# Patient Record
Sex: FEMALE | Race: OTHER | NOT HISPANIC OR LATINO | Employment: UNEMPLOYED | URBAN - METROPOLITAN AREA
[De-identification: names, ages, dates, MRNs, and addresses within clinical notes are randomized per-mention and may not be internally consistent; named-entity substitution may affect disease eponyms.]

---
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
## 2021-08-19 ENCOUNTER — IMMUNIZATION (OUTPATIENT)
Dept: OBSTETRICS AND GYNECOLOGY | Facility: CLINIC | Age: 21
End: 2021-08-19
Payer: MEDICAID

## 2021-08-19 DIAGNOSIS — Z23 NEED FOR VACCINATION: Primary | ICD-10-CM

## 2021-08-19 PROCEDURE — 0001A COVID-19, MRNA, LNP-S, PF, 30 MCG/0.3 ML DOSE VACCINE: ICD-10-PCS | Mod: CV19,,, | Performed by: FAMILY MEDICINE

## 2021-08-19 PROCEDURE — 91300 COVID-19, MRNA, LNP-S, PF, 30 MCG/0.3 ML DOSE VACCINE: ICD-10-PCS | Mod: ,,, | Performed by: FAMILY MEDICINE

## 2021-08-19 PROCEDURE — 91300 COVID-19, MRNA, LNP-S, PF, 30 MCG/0.3 ML DOSE VACCINE: CPT | Mod: ,,, | Performed by: FAMILY MEDICINE

## 2021-08-19 PROCEDURE — 0001A COVID-19, MRNA, LNP-S, PF, 30 MCG/0.3 ML DOSE VACCINE: CPT | Mod: CV19,,, | Performed by: FAMILY MEDICINE

## 2021-09-08 ENCOUNTER — IMMUNIZATION (OUTPATIENT)
Dept: OBSTETRICS AND GYNECOLOGY | Facility: CLINIC | Age: 21
End: 2021-09-08
Payer: MEDICAID

## 2021-09-08 DIAGNOSIS — Z23 NEED FOR VACCINATION: Primary | ICD-10-CM

## 2021-09-08 PROCEDURE — 91300 COVID-19, MRNA, LNP-S, PF, 30 MCG/0.3 ML DOSE VACCINE: CPT | Mod: ,,, | Performed by: FAMILY MEDICINE

## 2021-09-08 PROCEDURE — 0002A COVID-19, MRNA, LNP-S, PF, 30 MCG/0.3 ML DOSE VACCINE: ICD-10-PCS | Mod: CV19,,, | Performed by: FAMILY MEDICINE

## 2021-09-08 PROCEDURE — 91300 COVID-19, MRNA, LNP-S, PF, 30 MCG/0.3 ML DOSE VACCINE: ICD-10-PCS | Mod: ,,, | Performed by: FAMILY MEDICINE

## 2021-09-08 PROCEDURE — 0002A COVID-19, MRNA, LNP-S, PF, 30 MCG/0.3 ML DOSE VACCINE: CPT | Mod: CV19,,, | Performed by: FAMILY MEDICINE

## 2022-02-11 ENCOUNTER — HOSPITAL ENCOUNTER (EMERGENCY)
Facility: HOSPITAL | Age: 22
Discharge: HOME | End: 2022-02-11
Attending: EMERGENCY MEDICINE
Payer: MEDICAID

## 2022-02-11 VITALS
HEIGHT: 62 IN | HEART RATE: 90 BPM | BODY MASS INDEX: 33.13 KG/M2 | WEIGHT: 180 LBS | SYSTOLIC BLOOD PRESSURE: 118 MMHG | RESPIRATION RATE: 18 BRPM | OXYGEN SATURATION: 97 % | TEMPERATURE: 98 F | DIASTOLIC BLOOD PRESSURE: 70 MMHG

## 2022-02-11 DIAGNOSIS — J45.901 MILD ASTHMA WITH EXACERBATION, UNSPECIFIED WHETHER PERSISTENT: Primary | ICD-10-CM

## 2022-02-11 LAB
B-HCG UR QL: NEGATIVE
CTP QC/QA: YES
CTP QC/QA: YES
SARS-COV-2 RDRP RESP QL NAA+PROBE: NEGATIVE

## 2022-02-11 PROCEDURE — 25000242 PHARM REV CODE 250 ALT 637 W/ HCPCS: Performed by: PHYSICIAN ASSISTANT

## 2022-02-11 PROCEDURE — 94760 N-INVAS EAR/PLS OXIMETRY 1: CPT

## 2022-02-11 PROCEDURE — 93010 EKG 12-LEAD: ICD-10-PCS | Mod: ,,, | Performed by: INTERNAL MEDICINE

## 2022-02-11 PROCEDURE — 99284 EMERGENCY DEPT VISIT MOD MDM: CPT | Mod: 25

## 2022-02-11 PROCEDURE — 81025 URINE PREGNANCY TEST: CPT | Performed by: PHYSICIAN ASSISTANT

## 2022-02-11 PROCEDURE — 93010 ELECTROCARDIOGRAM REPORT: CPT | Mod: ,,, | Performed by: INTERNAL MEDICINE

## 2022-02-11 PROCEDURE — U0002 COVID-19 LAB TEST NON-CDC: HCPCS | Performed by: PHYSICIAN ASSISTANT

## 2022-02-11 PROCEDURE — 93005 ELECTROCARDIOGRAM TRACING: CPT

## 2022-02-11 PROCEDURE — 63600175 PHARM REV CODE 636 W HCPCS: Performed by: PHYSICIAN ASSISTANT

## 2022-02-11 RX ORDER — IPRATROPIUM BROMIDE AND ALBUTEROL SULFATE 2.5; .5 MG/3ML; MG/3ML
3 SOLUTION RESPIRATORY (INHALATION)
Status: COMPLETED | OUTPATIENT
Start: 2022-02-11 | End: 2022-02-11

## 2022-02-11 RX ORDER — PREDNISONE 20 MG/1
60 TABLET ORAL
Status: COMPLETED | OUTPATIENT
Start: 2022-02-11 | End: 2022-02-11

## 2022-02-11 RX ADMIN — PREDNISONE 60 MG: 20 TABLET ORAL at 08:02

## 2022-02-11 RX ADMIN — IPRATROPIUM BROMIDE AND ALBUTEROL SULFATE 3 ML: 2.5; .5 SOLUTION RESPIRATORY (INHALATION) at 08:02

## 2022-02-11 NOTE — DISCHARGE INSTRUCTIONS
Please return to the Emergency Department for any new or worsening symptoms including: fever, chest pain, shortness of breath, loss of consciousness, dizziness, weakness, or any other concerns.     Please schedule an appointment for follow up with your Primary Care Doctor as soon as possible for a recheck of your symptoms. If you do not have one, you may contact the one listed on your discharge paperwork or you may also call the [facility] Appointment Desk at [phone] to schedule an appointment with one.     Please take all medication as prescribed.

## 2022-02-11 NOTE — ED PROVIDER NOTES
"Encounter Date: 2/11/2022    SCRIBE #1 NOTE: I, Maritza Carrion, am scribing for, and in the presence of,  Mery De Leon PA-C. I have scribed the following portions of the note - Other sections scribed: HPI, ROS, PE.       History     Chief Complaint   Patient presents with    Shortness of Breath    Chest Pain     Patient  reports SOB and chest pain with deep breath, states H/O Asthma and hurts to take a deep breath, did inhaler no help, tachypnea in triage.      Tia Hunter is a 22 y.o. female, with a past medical history of Asthma, who presents to the ED today with a complaint of chest pain since this morning. The patient states that she woke up this morning with chest pain and used her Albuterol inhaler with no relief ("chest still feels tight"). She reports associated chest tightness, cough, and shortness of breath. She notes that her chest tightness is worsened when taking deep breaths. She states that she does not have a nebulizer machine at home. No known triggers. She denies fever, congestion, sore throat, abdominal pain, nausea, vomiting, or other associated symptoms. No other complaints at this time.    The history is provided by the patient.     Review of patient's allergies indicates:   Allergen Reactions    Citrus and derivatives     Shellfish containing products      No seafood.      Past Medical History:   Diagnosis Date    Asthma      No past surgical history on file.  No family history on file.  Social History     Tobacco Use    Smoking status: Never Smoker     Review of Systems   Constitutional: Negative for chills and fever.   HENT: Negative for congestion and sore throat.    Eyes: Negative for visual disturbance.   Respiratory: Positive for cough, chest tightness and shortness of breath.    Cardiovascular: Positive for chest pain.   Gastrointestinal: Negative for abdominal pain, diarrhea, nausea and vomiting.   Genitourinary: Negative for dysuria.   Musculoskeletal: Negative for neck " pain.   Skin: Negative for rash.   Allergic/Immunologic: Negative for immunocompromised state.   Neurological: Negative for headaches.   Psychiatric/Behavioral: Negative for dysphoric mood.       Physical Exam     Initial Vitals [02/11/22 0738]   BP Pulse Resp Temp SpO2   129/76 99 (!) 24 98.2 °F (36.8 °C) 100 %      MAP       --         Physical Exam    Nursing note and vitals reviewed.  Constitutional: She appears well-developed and well-nourished. She is not diaphoretic. No distress.   HENT:   Head: Normocephalic and atraumatic.   Mouth/Throat: Oropharynx is clear and moist. No oropharyngeal exudate.   Eyes: Conjunctivae and EOM are normal. Pupils are equal, round, and reactive to light.   Neck: Neck supple.   Normal range of motion.  Cardiovascular: Normal rate, regular rhythm, normal heart sounds and intact distal pulses.   Pulmonary/Chest: No respiratory distress. She has no wheezes. She has no rhonchi. She has no rales.   Slightly decreased breath sounds over the right lower lung zone. No wheezing heard. The patient is speaking clear and full sentences with no evidence of respiratory distress.   Musculoskeletal:         General: Normal range of motion.      Cervical back: Normal range of motion and neck supple.     Neurological: She is alert and oriented to person, place, and time. GCS score is 15. GCS eye subscore is 4. GCS verbal subscore is 5. GCS motor subscore is 6.   Skin: Skin is warm and dry. Capillary refill takes less than 2 seconds.   Psychiatric: She has a normal mood and affect. Thought content normal.         ED Course   Procedures  Labs Reviewed   POCT URINE PREGNANCY - Normal   SARS-COV-2 RDRP GENE - Normal          Imaging Results          X-Ray Chest PA And Lateral (Final result)  Result time 02/11/22 08:11:24    Final result by Tu Barragan DO (02/11/22 08:11:24)                 Impression:      No acute cardiopulmonary abnormality.      Electronically signed by: Tu  Laurel  Date:    02/11/2022  Time:    08:11             Narrative:    EXAMINATION:  XR CHEST PA AND LATERAL    CLINICAL HISTORY:  Asthma;    TECHNIQUE:  PA and lateral views of the chest were performed.    COMPARISON:  None    FINDINGS:  The lungs are well expanded and clear. No focal opacities are seen. The pleural spaces are clear.    The cardiac silhouette is unremarkable.    The visualized osseous structures are unremarkable.                                 Medications   albuterol-ipratropium 2.5 mg-0.5 mg/3 mL nebulizer solution 3 mL (3 mLs Nebulization Given 2/11/22 0859)   predniSONE tablet 60 mg (60 mg Oral Given 2/11/22 0826)           APC / Resident Notes:   22 year old patient presenting with sob consistent with an asthma exacerbation. Patient has no altered mental status, improved respiratory status, and no signs of impending ventilator failure. Patient was given 2.5 mg of albuterol and 0.5 mg of ipratropium. Patient has home regimen to control further exacerbations and primary care follow up.     Also considered but less likely:   Pneumonia: no fever, unilateral ronchi, or signs concerning of pneumonia  COPD: no history of COPD  ACS: presentation atypical and more consistent with asthma  Pneumothorax: bilateral breath sounds and wheezing bilaterally  CHF: no signs of fluid overload and no history    Return precautions given, patient understands and agrees with plan. All questions answered.  Instructed to follow up with PCP.        Scribe Attestation:   Scribe #1: I performed the above scribed service and the documentation accurately describes the services I performed. I attest to the accuracy of the note.                 Clinical Impression:   Final diagnoses:  [J45.901] Mild asthma with exacerbation, unspecified whether persistent (Primary)          ED Disposition Condition    Discharge Stable        ED Prescriptions     None        Follow-up Information     Follow up With Specialties Details Why  Contact Info    Ethan Alvarenga MD Pediatrics Schedule an appointment as soon as possible for a visit   [address] [place] [address]  [phone]      [facility] Emergency Medicine  If symptoms worsen [address] [place] [address] [phone]         I, Mery De Leon PA-C, personally performed the services described in this documentation. All medical record entries made by the scribe were at my direction and in my presence. I have reviewed the chart and agree that the record reflects my personal performance and is accurate and complete.     Mery De Leon PA-C  02/11/22 1212

## 2022-02-11 NOTE — ED TRIAGE NOTES
Patient presents to ED c/o chest pain and SOB that started this morning. Pain is located at center of chest and denies pain radiating.Pain 8/10. Reports taking two pumps of inhaler with no relief. Hx of asthma.       Denies N/V/D, fever, chills.     AAOx4

## 2023-03-20 ENCOUNTER — HOSPITAL ENCOUNTER (EMERGENCY)
Facility: HOSPITAL | Age: 23
Discharge: HOME | End: 2023-03-20
Attending: EMERGENCY MEDICINE
Payer: MEDICAID

## 2023-03-20 VITALS
RESPIRATION RATE: 18 BRPM | OXYGEN SATURATION: 100 % | WEIGHT: 209 LBS | HEIGHT: 62 IN | TEMPERATURE: 99 F | SYSTOLIC BLOOD PRESSURE: 141 MMHG | DIASTOLIC BLOOD PRESSURE: 94 MMHG | BODY MASS INDEX: 38.46 KG/M2 | HEART RATE: 79 BPM

## 2023-03-20 DIAGNOSIS — R10.9 ABDOMINAL CRAMPING: ICD-10-CM

## 2023-03-20 DIAGNOSIS — N93.9 VAGINAL BLEEDING: Primary | ICD-10-CM

## 2023-03-20 LAB
ABO + RH BLD: NORMAL
ALBUMIN SERPL BCP-MCNC: 3.9 G/DL (ref 3.5–5.2)
ALP SERPL-CCNC: 69 U/L (ref 55–135)
ALT SERPL W/O P-5'-P-CCNC: 12 U/L (ref 10–44)
ANION GAP SERPL CALC-SCNC: 11 MMOL/L (ref 8–16)
APTT BLDCRRT: 25.3 SEC (ref 21–32)
AST SERPL-CCNC: 19 U/L (ref 10–40)
B-HCG UR QL: NEGATIVE
BACTERIA GENITAL QL WET PREP: ABNORMAL
BASOPHILS # BLD AUTO: 0.07 K/UL (ref 0–0.2)
BASOPHILS NFR BLD: 1 % (ref 0–1.9)
BILIRUB SERPL-MCNC: 0.2 MG/DL (ref 0.1–1)
BLD GP AB SCN CELLS X3 SERPL QL: NORMAL
BUN SERPL-MCNC: 9 MG/DL (ref 6–20)
CALCIUM SERPL-MCNC: 9.3 MG/DL (ref 8.7–10.5)
CHLORIDE SERPL-SCNC: 108 MMOL/L (ref 95–110)
CLUE CELLS VAG QL WET PREP: ABNORMAL
CO2 SERPL-SCNC: 23 MMOL/L (ref 23–29)
CREAT SERPL-MCNC: 0.8 MG/DL (ref 0.5–1.4)
CTP QC/QA: YES
DIFFERENTIAL METHOD: ABNORMAL
EOSINOPHIL # BLD AUTO: 0.5 K/UL (ref 0–0.5)
EOSINOPHIL NFR BLD: 7.7 % (ref 0–8)
ERYTHROCYTE [DISTWIDTH] IN BLOOD BY AUTOMATED COUNT: 12.5 % (ref 11.5–14.5)
EST. GFR  (NO RACE VARIABLE): >60 ML/MIN/1.73 M^2
FILAMENT FUNGI VAG WET PREP-#/AREA: ABNORMAL
GLUCOSE SERPL-MCNC: 79 MG/DL (ref 70–110)
HCT VFR BLD AUTO: 42.1 % (ref 37–48.5)
HGB BLD-MCNC: 13.8 G/DL (ref 12–16)
IMM GRANULOCYTES # BLD AUTO: 0.02 K/UL (ref 0–0.04)
IMM GRANULOCYTES NFR BLD AUTO: 0.3 % (ref 0–0.5)
INR PPP: 0.9 (ref 0.8–1.2)
LYMPHOCYTES # BLD AUTO: 1.8 K/UL (ref 1–4.8)
LYMPHOCYTES NFR BLD: 26.2 % (ref 18–48)
MCH RBC QN AUTO: 29 PG (ref 27–31)
MCHC RBC AUTO-ENTMCNC: 32.8 G/DL (ref 32–36)
MCV RBC AUTO: 88 FL (ref 82–98)
MONOCYTES # BLD AUTO: 0.4 K/UL (ref 0.3–1)
MONOCYTES NFR BLD: 6.1 % (ref 4–15)
NEUTROPHILS # BLD AUTO: 4 K/UL (ref 1.8–7.7)
NEUTROPHILS NFR BLD: 58.7 % (ref 38–73)
NRBC BLD-RTO: 0 /100 WBC
PLATELET # BLD AUTO: 483 K/UL (ref 150–450)
PMV BLD AUTO: 8.3 FL (ref 9.2–12.9)
POTASSIUM SERPL-SCNC: 4.5 MMOL/L (ref 3.5–5.1)
PROT SERPL-MCNC: 7.9 G/DL (ref 6–8.4)
PROTHROMBIN TIME: 10 SEC (ref 9–12.5)
RBC # BLD AUTO: 4.76 M/UL (ref 4–5.4)
SODIUM SERPL-SCNC: 142 MMOL/L (ref 136–145)
SPECIMEN SOURCE: ABNORMAL
T VAGINALIS GENITAL QL WET PREP: ABNORMAL
WBC # BLD AUTO: 6.73 K/UL (ref 3.9–12.7)
WBC #/AREA VAG WET PREP: ABNORMAL
YEAST GENITAL QL WET PREP: ABNORMAL

## 2023-03-20 PROCEDURE — 87591 N.GONORRHOEAE DNA AMP PROB: CPT

## 2023-03-20 PROCEDURE — 25000003 PHARM REV CODE 250

## 2023-03-20 PROCEDURE — 99284 EMERGENCY DEPT VISIT MOD MDM: CPT | Mod: 25

## 2023-03-20 PROCEDURE — 96361 HYDRATE IV INFUSION ADD-ON: CPT

## 2023-03-20 PROCEDURE — 85730 THROMBOPLASTIN TIME PARTIAL: CPT

## 2023-03-20 PROCEDURE — 96360 HYDRATION IV INFUSION INIT: CPT

## 2023-03-20 PROCEDURE — 85025 COMPLETE CBC W/AUTO DIFF WBC: CPT

## 2023-03-20 PROCEDURE — 85610 PROTHROMBIN TIME: CPT

## 2023-03-20 PROCEDURE — 81025 URINE PREGNANCY TEST: CPT | Performed by: PHYSICIAN ASSISTANT

## 2023-03-20 PROCEDURE — 87210 SMEAR WET MOUNT SALINE/INK: CPT

## 2023-03-20 PROCEDURE — 86900 BLOOD TYPING SEROLOGIC ABO: CPT

## 2023-03-20 PROCEDURE — 80053 COMPREHEN METABOLIC PANEL: CPT

## 2023-03-20 RX ORDER — IBUPROFEN 400 MG/1
400 TABLET ORAL EVERY 6 HOURS PRN
Qty: 30 TABLET | Refills: 0 | Status: SHIPPED | OUTPATIENT
Start: 2023-03-20

## 2023-03-20 RX ADMIN — SODIUM CHLORIDE 1000 ML: 9 INJECTION, SOLUTION INTRAVENOUS at 03:03

## 2023-03-20 NOTE — ED PROVIDER NOTES
Encounter Date: 3/20/2023       History     Chief Complaint   Patient presents with    Vaginal Bleeding     Patient is having some vaginal bleeding since January. Bleeding is bright red and is intermittent between menstrual cycles. Reported occasional abdominal cramping. LMP-3/3/23     23-year-old female with no past medical history presents to ED complaining of acute onset intermittent vaginal bleeding with associated lower abdominal cramping since January 2023.  She reports noticing bright red vaginal bleeding post wiping intermittently after urination.  She reports history of irregular menses.  Denies any abdominal cramping at this time.  She is attempted 800 mg ibuprofen for her cramping in the past with relief to her pain.  Her last menstrual period was 03/01/2023.  She is also complaining of intermittent white vaginal discharge.  She denies any concern for STDs.  She denies any fever, chills, chest pain, shortness of breath, nausea, vomiting, diarrhea, dysuria, hematuria.  No other symptoms reported.    The history is provided by the patient. No  was used.   Review of patient's allergies indicates:   Allergen Reactions    Citrus and derivatives     Shellfish containing products      No seafood.      Past Medical History:   Diagnosis Date    Asthma      No past surgical history on file.  No family history on file.  Social History     Tobacco Use    Smoking status: Never     Review of Systems   Constitutional:  Negative for chills and fever.   HENT:  Negative for congestion, ear pain, rhinorrhea and sore throat.    Eyes:  Negative for redness.   Respiratory:  Negative for cough and shortness of breath.    Cardiovascular:  Negative for chest pain.   Gastrointestinal:  Positive for abdominal pain. Negative for diarrhea, nausea and vomiting.   Genitourinary:  Positive for vaginal bleeding and vaginal discharge. Negative for decreased urine volume, difficulty urinating, dysuria, frequency,  hematuria and urgency.   Musculoskeletal:  Negative for back pain and neck pain.   Skin:  Negative for rash.   Neurological:  Negative for headaches.   Psychiatric/Behavioral:  Negative for confusion.      Physical Exam     Initial Vitals [03/20/23 1253]   BP Pulse Resp Temp SpO2   134/82 103 18 98.9 °F (37.2 °C) 100 %      MAP       --         Physical Exam    Nursing note and vitals reviewed.  Constitutional: She appears well-developed and well-nourished.  Non-toxic appearance. She does not appear ill.   HENT:   Head: Normocephalic and atraumatic.   Right Ear: Hearing, tympanic membrane, external ear and ear canal normal. Tympanic membrane is not perforated, not erythematous and not bulging.   Left Ear: Hearing, tympanic membrane, external ear and ear canal normal. Tympanic membrane is not perforated, not erythematous and not bulging.   Nose: Nose normal.   Mouth/Throat: Uvula is midline, oropharynx is clear and moist and mucous membranes are normal.   Eyes: Conjunctivae and EOM are normal.   Neck: Neck supple.   Normal range of motion.   Full passive range of motion without pain.     Cardiovascular:  Normal rate and regular rhythm.           Pulses:       Radial pulses are 2+ on the right side and 2+ on the left side.   Pulmonary/Chest: Effort normal and breath sounds normal. No accessory muscle usage. No respiratory distress. She has no decreased breath sounds.   Abdominal: Abdomen is soft. Bowel sounds are normal. She exhibits no distension. There is no abdominal tenderness.   No right CVA tenderness.  No left CVA tenderness. There is no rebound and no guarding.   Genitourinary:    Genitourinary Comments:  exam chaperoned by nursePatricia. Scant vaginal bleeding. No hemorrhage. No adnexal masses or tenderness. Cervix is not friable. No CMT. No vaginal erythema, foreign body, or tenderness.      Musculoskeletal:         General: Normal range of motion.      Cervical back: Full passive range of motion without  pain, normal range of motion and neck supple. No rigidity.     Neurological: She is alert. No cranial nerve deficit.   Neuro intact.  Strength and sensation intact bilateral upper and lower extremities.   Skin: Skin is warm and dry.   Psychiatric: She has a normal mood and affect.       ED Course   Procedures  Labs Reviewed   VAGINAL SCREEN - Abnormal; Notable for the following components:       Result Value    WBC - Vaginal Screen Occasional (*)     Bacteria - Vaginal Screen Few (*)     All other components within normal limits    Narrative:     Release to patient->Immediate   CBC W/ AUTO DIFFERENTIAL - Abnormal; Notable for the following components:    Platelets 483 (*)     MPV 8.3 (*)     All other components within normal limits   C. TRACHOMATIS/N. GONORRHOEAE BY AMP DNA   COMPREHENSIVE METABOLIC PANEL   APTT   PROTIME-INR   POCT URINE PREGNANCY   TYPE & SCREEN          Imaging Results              US Pelvis Comp with Transvag NON-OB (xpd (Final result)  Result time 03/20/23 16:55:25      Final result by Hal Coughlin MD (03/20/23 16:55:25)                   Impression:      No acute sonographic abnormalities of the uterus or ovaries.      Electronically signed by: Hal Coughlin MD  Date:    03/20/2023  Time:    16:55               Narrative:    EXAMINATION:  US PELVIS COMP WITH TRANSVAG NON-OB (XPD)    CLINICAL HISTORY:  vaginal bleeding;    TECHNIQUE:  Transabdominal sonography of the pelvis was performed, followed by transvaginal sonography to better evaluate the uterus and ovaries.    COMPARISON:  None.    FINDINGS:  The uterus measures approximately 6.0 x 2.5 x 3.5 cm.  The cervix is unremarkable.  The endometrial stripe is not thickened measuring 0.4 cm.  No abnormal endometrial hyperemia.    The right ovary measures approximately 3.2 x 1.7 x 2.3 cm.  The left ovary measures approximately 2.8 x 1.7 x 2.1 cm.  Arterial and venous flow is documented to the ovaries bilaterally.  No significant free  fluid in the pelvis.                                       Medications   sodium chloride 0.9% bolus 1,000 mL 1,000 mL (0 mLs Intravenous Stopped 3/20/23 1733)     Medical Decision Making:   Clinical Tests:   Lab Tests: Ordered and Reviewed  Radiological Study: Ordered and Reviewed  ED Management:  This is a 23-year-old female with no past medical history presents to ED complaining of acute onset intermittent vaginal bleeding with associated lower abdominal cramping since January 2023.  On physical exam, patient is well-appearing and in no acute distress.  Nontoxic appearing.  Lungs are clear to auscultation bilaterally.  Abdomen is soft and nontender.  No guarding, rigidity, rebound.  2+ radial pulses bilaterally.  Posterior oropharynx is not erythematous.  No edema or exudate.  exam chaperoned by nursePatricia. Scant vaginal bleeding. No hemorrhage. No adnexal masses or tenderness. Cervix is not friable. No CMT. No vaginal erythema, foreign body, or tenderness.  Uvula midline.  Bilateral tympanic membrane is normal.  No erythema, bulging, or perforations.  Neuro intact.  Strength and sensation intact bilateral upper and lower extremities.  No CVA tenderness bilaterally.  CBC unremarkable.  Hemoglobin 13.8.  Hematocrit 42.1.  Doubt anemia.  Fluids ordered.  Will reassess. Vaginal screen revealed occasional WBC and few bacteria. GC pending. CBC revealed platelet 483. No leukocytosis. Hb 13.8 and Hct 42.1. no evidence of anemia. PTT and PTinr unremarkable. Patient is AB+. uPT is negative.  US revealed No acute sonographic abnormalities of the uterus or ovaries. Unsure etiology of patient's symptoms. Will discharge patient on ibuprofen as needed for pain. Urged prompt f/u with OBGYN and PCP for further evaluation.    Strict return precautions given. I discussed with the patient/family the diagnosis, treatment plan, indications for return to the emergency department, and for expected follow-up. The patient/family  verbalized an understanding. The patient/family is asked if there are any questions or concerns. We discuss the case, until all issues are addressed to the patient/family's satisfaction. Patient/family understands and is agreeable to the plan. Patient is stable and ready for discharge.                          Clinical Impression:   Final diagnoses:  [N93.9] Vaginal bleeding (Primary)  [R10.9] Abdominal cramping        ED Disposition Condition    Discharge Stable          ED Prescriptions       Medication Sig Dispense Start Date End Date Auth. Provider    ibuprofen (ADVIL,MOTRIN) 400 MG tablet Take 1 tablet (400 mg total) by mouth every 6 (six) hours as needed for Other (pain). 30 tablet 3/20/2023 -- Cayetano Becker PA-C          Follow-up Information       Follow up With Specialties Details Why Contact Info    Ethan Alvarenga MD Pediatrics In 2 days for further evaluation [address] [facility] [address]  [phone]      [facility] - Emergency Dept Emergency Medicine In 2 days  [address] [facility] [address] [phone]             Cayetano Becker PA-C  03/20/23 6693

## 2023-03-20 NOTE — DISCHARGE INSTRUCTIONS

## 2023-03-20 NOTE — FIRST PROVIDER EVALUATION
Emergency Department TeleTriage Encounter Note      CHIEF COMPLAINT    Chief Complaint   Patient presents with    Vaginal Bleeding     Patient is having some vaginal bleeding since January. Bleeding is bright red and is intermittent between menstrual cycles. Reported occasional abdominal cramping. LMP-3/3/23       VITAL SIGNS   Initial Vitals [03/20/23 1253]   BP Pulse Resp Temp SpO2   134/82 103 18 98.9 °F (37.2 °C) 100 %      MAP       --            ALLERGIES    Review of patient's allergies indicates:   Allergen Reactions    Citrus and derivatives     Shellfish containing products      No seafood.        PROVIDER TRIAGE NOTE  Patient presents with complaint of vaginal bleeding yesterday. No bleeding today. She reports irregular bleeding for two months. She reports associated vaginal cramping. She reports no vaginal discharge. No urinary symptoms.      Phy:   Constitutional: well nourished, well developed, appearing stated age, NAD   HEENT: NCAT, symmetrical lids, No obvious facial deformity.  Normal phonation. Normal Conjunctiva   Neck: NAROM   Respiratory: Normal effort.  No obvious use of accessory muscles   Musculoskeletal: Moved upper extremities well   Neuro: Alert, answers questions appropriately    Psych: appropriate mood and affect      Initial orders will be placed and care will be transferred to an alternate provider when patient is roomed for a full evaluation. Any additional orders and the final disposition will be determined by that provider.        ORDERS  Labs Reviewed - No data to display    ED Orders (720h ago, onward)      None              Virtual Visit Note: The provider triage portion of this emergency department evaluation and documentation was performed via [employer], a HIPAA-compliant telemedicine application, in concert with a tele-presenter in the room. A face to face patient evaluation with one of my colleagues will occur once the patient is placed in an emergency department  room.      DISCLAIMER: This note was prepared with [employer] voice recognition transcription software. Garbled syntax, mangled pronouns, and other bizarre constructions may be attributed to that software system.

## 2023-03-20 NOTE — ED TRIAGE NOTES
Vaginal Bleeding (Patient is having some vaginal bleeding since January. Bleeding is bright red and is intermittent between menstrual cycles. Reported occasional abdominal cramping. LMP-3/3/23)

## 2023-03-21 LAB
C TRACH DNA SPEC QL NAA+PROBE: NOT DETECTED
N GONORRHOEA DNA SPEC QL NAA+PROBE: NOT DETECTED

## 2023-09-04 ENCOUNTER — HOSPITAL ENCOUNTER (EMERGENCY)
Facility: HOSPITAL | Age: 23
Discharge: HOME | End: 2023-09-04
Attending: EMERGENCY MEDICINE
Payer: MEDICAID

## 2023-09-04 VITALS
WEIGHT: 196 LBS | HEART RATE: 103 BPM | TEMPERATURE: 98 F | HEIGHT: 62 IN | OXYGEN SATURATION: 100 % | SYSTOLIC BLOOD PRESSURE: 126 MMHG | BODY MASS INDEX: 36.07 KG/M2 | DIASTOLIC BLOOD PRESSURE: 72 MMHG | RESPIRATION RATE: 18 BRPM

## 2023-09-04 DIAGNOSIS — S99.912A ANKLE INJURY, LEFT, INITIAL ENCOUNTER: ICD-10-CM

## 2023-09-04 DIAGNOSIS — S93.402A SPRAIN OF LEFT ANKLE, UNSPECIFIED LIGAMENT, INITIAL ENCOUNTER: Primary | ICD-10-CM

## 2023-09-04 LAB
B-HCG UR QL: NEGATIVE
CTP QC/QA: YES

## 2023-09-04 PROCEDURE — 81025 URINE PREGNANCY TEST: CPT

## 2023-09-04 PROCEDURE — 96372 THER/PROPH/DIAG INJ SC/IM: CPT

## 2023-09-04 PROCEDURE — 99284 EMERGENCY DEPT VISIT MOD MDM: CPT

## 2023-09-04 PROCEDURE — 63600175 PHARM REV CODE 636 W HCPCS

## 2023-09-04 RX ORDER — ACETAMINOPHEN 500 MG
500 TABLET ORAL EVERY 6 HOURS PRN
Qty: 28 TABLET | Refills: 0 | Status: SHIPPED | OUTPATIENT
Start: 2023-09-04 | End: 2023-09-11

## 2023-09-04 RX ORDER — NAPROXEN 500 MG/1
500 TABLET ORAL 2 TIMES DAILY WITH MEALS
Qty: 10 TABLET | Refills: 0 | Status: SHIPPED | OUTPATIENT
Start: 2023-09-04 | End: 2023-09-09

## 2023-09-04 RX ORDER — KETOROLAC TROMETHAMINE 30 MG/ML
30 INJECTION, SOLUTION INTRAMUSCULAR; INTRAVENOUS
Status: COMPLETED | OUTPATIENT
Start: 2023-09-04 | End: 2023-09-04

## 2023-09-04 RX ADMIN — KETOROLAC TROMETHAMINE 30 MG: 30 INJECTION, SOLUTION INTRAMUSCULAR; INTRAVENOUS at 06:09

## 2023-09-04 NOTE — ED PROVIDER NOTES
"Encounter Date: 9/4/2023    SCRIBE #1 NOTE: I, Kenisha Willoughby, am scribing for, and in the presence of,  Trevor Islas PA-C. I have scribed the following portions of the note - Other sections scribed: HPI/ROS.       History     Chief Complaint   Patient presents with    Ankle Pain     Pt to ED reporting left ankle swelling and pain x yesterday. Pt reporting she came down on ankle wrong at [employer].      Tia Hunter is a 23 y.o. female, with a PMHx of Asthma, who presents to the ED with left ankle pain s/p fall on yesterday. Pt notes jumping at [employer] when she "came down wrong". Pt heard a pop and then noticed left ankle swelling. Pt notes pain is 9/10. Pt has increased pain to medial and lateral ankle. Pt attempted treatment with Tylenol. Pt wearing a boot she had at home from previous injury to left ankle in March 2023. Pt notes she can't take any steps on her own. This is the extent of the patient's complaints today in the Emergency Department.            The history is provided by the patient.     Review of patient's allergies indicates:   Allergen Reactions    Citrus and derivatives     Shellfish containing products      No seafood.      Past Medical History:   Diagnosis Date    Asthma      No past surgical history on file.  No family history on file.  Social History     Tobacco Use    Smoking status: Never     Review of Systems   Constitutional:  Negative for chills and fever.   HENT:  Negative for facial swelling and sore throat.    Eyes:  Negative for visual disturbance.   Respiratory:  Negative for cough and shortness of breath.    Cardiovascular:  Negative for chest pain and palpitations.   Gastrointestinal:  Negative for abdominal pain, nausea and vomiting.   Genitourinary:  Negative for dysuria and hematuria.   Musculoskeletal:  Negative for back pain.        Positive for left ankle pain and swelling.   Skin:  Negative for rash.   Neurological:  Negative for weakness and headaches. "   Hematological:  Does not bruise/bleed easily.   Psychiatric/Behavioral: Negative.         Physical Exam     Initial Vitals [09/04/23 1722]   BP Pulse Resp Temp SpO2   126/72 103 18 98.2 °F (36.8 °C) 100 %      MAP       --         Physical Exam    Nursing note and vitals reviewed.  Constitutional: Vital signs are normal. She appears well-developed and well-nourished. She is cooperative. She does not appear ill. No distress.   HENT:   Head: Normocephalic and atraumatic.   Right Ear: Hearing and external ear normal.   Left Ear: Hearing and external ear normal.   Nose: Nose normal.   Eyes: Conjunctivae and EOM are normal.   Neck: Phonation normal.   Normal range of motion.  Cardiovascular:  Normal rate and regular rhythm.           No murmur heard.  Pulmonary/Chest: Effort normal. No respiratory distress.   Abdominal: Abdomen is soft. She exhibits no distension. There is no abdominal tenderness.   Musculoskeletal:      Cervical back: Normal range of motion.      Left ankle: No swelling, deformity or ecchymosis. Tenderness present over the lateral malleolus and medial malleolus. Decreased range of motion.      Left Achilles Tendon: Normal. Faria's test negative.      Comments: Left ankle with no significant swelling or deformity.  Decreased active range of motion on flexion and extension.  Tenderness over the lateral and medial malleoli.  Patient is ambulatory.     Neurological: She is alert and oriented to person, place, and time. GCS eye subscore is 4. GCS verbal subscore is 5. GCS motor subscore is 6.   Skin: Skin is warm. Capillary refill takes less than 2 seconds.         ED Course   Procedures  Labs Reviewed   POCT URINE PREGNANCY          Imaging Results              X-Ray Ankle Complete Left (Final result)  Result time 09/04/23 18:57:12      Final result by Hal Coughlin MD (09/04/23 18:57:12)                   Impression:      1. No acute displaced fracture or dislocation of the  ankle.      Electronically signed by: Hal Coughlin MD  Date:    09/04/2023  Time:    18:57               Narrative:    EXAMINATION:  XR ANKLE COMPLETE 3 VIEW LEFT    CLINICAL HISTORY:  Unspecified injury of left ankle, initial encounter    TECHNIQUE:  AP, lateral and oblique views of the left ankle were performed.    COMPARISON:  None    FINDINGS:  Three views left ankle.    There is edema overlying the lateral malleolus.  The ankle mortise is intact.  No acute displaced fracture or dislocation of the ankle.  No radiopaque foreign body.                                    X-Rays:   Independently Interpreted Readings:   Other Readings:  X-ray ankle three-view left:   Bones are well mineralized.  No acute fractures or dislocations.  Ankle mortise is intact.  No significant soft tissue swelling.  No radiopaque foreign body.    Medications   ketorolac injection 30 mg (30 mg Intramuscular Given 9/4/23 1816)     Medical Decision Making  23-year-old female presenting to the emergency department with a chief complaint of ankle pain.  She does not recall the exact mechanism of injury but she was jumping on a trampoline.  She has been having achy pain since the time of the injury yesterday.  She was ambulatory.  On physical exam, she was clinically well-appearing and in no acute distress.  Tenderness over the medial and lateral malleoli of the left ankle.    Differential diagnosis includes but is not limited to contusion, strain, sprain, fracture, dislocation, or ligamentous injury of the affected ankle.     X-rays negative for any acute fractures or dislocations of the left ankle.  Presentation is consistent with an ankle sprain.  Patient arrived wearing a walking boot, advised her that she could continue doing this if it would help her feel more stable walking.  Acute pain management in the emergency department with 1 dose of Toradol.  Naproxen and Tylenol electronically prescribed and sent to the patient's preferred  pharmacy.  Patient is stable for discharge home.    Return precautions were discussed, all patient questions were answered, and the patient was agreeable to the plan of care.  She was discharged home in stable condition and will follow up with her primary care provider or return to the emergency department if her symptoms worsen or do not improve.     Amount and/or Complexity of Data Reviewed  Radiology: ordered.    Risk  OTC drugs.  Prescription drug management.            Scribe Attestation:   Scribe #1: I performed the above scribed service and the documentation accurately describes the services I performed. I attest to the accuracy of the note.                        Clinical Impression:   Final diagnoses:  [S99.912A] Ankle injury, left, initial encounter  [S93.402A] Sprain of left ankle, unspecified ligament, initial encounter (Primary)        ED Disposition Condition    Discharge Stable          ED Prescriptions       Medication Sig Dispense Start Date End Date Auth. Provider    acetaminophen (TYLENOL) 500 MG tablet Take 1 tablet (500 mg total) by mouth every 6 (six) hours as needed. 28 tablet 9/4/2023 9/11/2023 Trevor Islas PA-C    naproxen (NAPROSYN) 500 MG tablet Take 1 tablet (500 mg total) by mouth 2 (two) times daily with meals. for 5 days 10 tablet 9/4/2023 9/9/2023 Trevor Islas PA-C          Follow-up Information       Follow up With Specialties Details Why Contact Info    Ethan Alvarenga MD Pediatrics Schedule an appointment as soon as possible for a visit  As needed, If symptoms worsen [address] [facility] [address]  [phone]      [facility] Emergency Dept Emergency Medicine Go to  If symptoms worsen [address]  [facility] [address] [phone]        Trevor PURCELL PA-C, personally performed the services described in this documentation. All medical record entries made by the scribe were at my direction and in my presence. I have reviewed the chart and  agree that the record reflects my personal performance and is accurate and complete.      Trevor Islas, PA-C  09/04/23 1457

## 2023-09-04 NOTE — Clinical Note
"Tia"Lois Hunter was seen and treated in our emergency department on 9/4/2023.  She may return to work on 09/05/2023.       If you have any questions or concerns, please don't hesitate to call.      Trevor Islas, CHRISTY"

## 2023-09-05 NOTE — DISCHARGE INSTRUCTIONS

## 2025-07-24 ENCOUNTER — HOSPITAL ENCOUNTER (EMERGENCY)
Facility: HOSPITAL | Age: 25
Discharge: HOME | End: 2025-07-24
Attending: EMERGENCY MEDICINE
Payer: COMMERCIAL

## 2025-07-24 VITALS
OXYGEN SATURATION: 100 % | DIASTOLIC BLOOD PRESSURE: 77 MMHG | TEMPERATURE: 98 F | HEART RATE: 67 BPM | RESPIRATION RATE: 20 BRPM | HEIGHT: 62 IN | WEIGHT: 140 LBS | SYSTOLIC BLOOD PRESSURE: 141 MMHG | BODY MASS INDEX: 25.76 KG/M2

## 2025-07-24 DIAGNOSIS — M25.571 ACUTE RIGHT ANKLE PAIN: Primary | ICD-10-CM

## 2025-07-24 DIAGNOSIS — S93.401A SPRAIN OF RIGHT ANKLE, UNSPECIFIED LIGAMENT, INITIAL ENCOUNTER: ICD-10-CM

## 2025-07-24 DIAGNOSIS — M25.571 RIGHT ANKLE PAIN: ICD-10-CM

## 2025-07-24 LAB
B-HCG UR QL: NEGATIVE
CTP QC/QA: YES

## 2025-07-24 PROCEDURE — 81025 URINE PREGNANCY TEST: CPT | Performed by: EMERGENCY MEDICINE

## 2025-07-24 PROCEDURE — 99283 EMERGENCY DEPT VISIT LOW MDM: CPT | Mod: 25

## 2025-07-24 PROCEDURE — 25000003 PHARM REV CODE 250: Performed by: EMERGENCY MEDICINE

## 2025-07-24 RX ORDER — NAPROXEN 500 MG/1
500 TABLET ORAL 2 TIMES DAILY WITH MEALS
Qty: 60 TABLET | Refills: 0 | Status: SHIPPED | OUTPATIENT
Start: 2025-07-24

## 2025-07-24 RX ORDER — NAPROXEN 250 MG/1
500 TABLET ORAL
Status: COMPLETED | OUTPATIENT
Start: 2025-07-24 | End: 2025-07-24

## 2025-07-24 RX ADMIN — NAPROXEN 500 MG: 250 TABLET ORAL at 02:07

## 2025-07-24 NOTE — Clinical Note
"Tia"Lois Hunter was seen and treated in our emergency department on 7/24/2025.  She may return with limitations on 07/24/2025.  Ms Hunter may return to work but needs to be able to sit on a stool with the right ankle elevated for 2 weeks.       Sincerely,      Sridevi Lau MD    "

## 2025-07-24 NOTE — ED PROVIDER NOTES
Encounter Date: 7/24/2025       History     Chief Complaint   Patient presents with    Ankle Pain     R ankle pain for 3 days      Emergent eval of a 25-year-old female who presents to the ER due to right ankle pain for the past 3 days patient reports on Monday after work she was wearing tennis shoes when out with friends.  Was drinking alcohol.  By the end of the night she was unable to bear weight on her right ankle.  She would not remember injuring her ankle.  Since that time she has been wearing a walking boot and Ace wrap that she had from previous injury.  She reports both medial and lateral ankle pain as well as anterior proximal foot pain without swelling.  No knee pain or proximal fibula pain no open wounds      Review of patient's allergies indicates:   Allergen Reactions    Citrus and derivatives     Shellfish containing products      No seafood.      Past Medical History:   Diagnosis Date    Asthma      History reviewed. No pertinent surgical history.  No family history on file.  Social History[1]  Review of Systems   Constitutional:  Positive for activity change.   Musculoskeletal:  Positive for arthralgias and gait problem. Negative for joint swelling, myalgias, neck pain and neck stiffness.   Skin:  Negative for color change, pallor, rash and wound.   Neurological:  Negative for weakness and numbness.   All other systems reviewed and are negative.      Physical Exam     Initial Vitals [07/24/25 0039]   BP Pulse Resp Temp SpO2   126/76 88 20 98.6 °F (37 °C) 100 %      MAP       --         Physical Exam    Nursing note and vitals reviewed.  Constitutional: She appears well-developed and well-nourished. She is not diaphoretic. No distress.   Cardiovascular:  Intact distal pulses.           Musculoskeletal:         General: Tenderness present. No edema. Normal range of motion.      Right knee: Normal.      Right lower leg: Normal.      Right ankle: No swelling, deformity, ecchymosis or lacerations.  Tenderness present over the lateral malleolus, medial malleolus, ATF ligament, AITF ligament, CF ligament and posterior TF ligament. No base of 5th metatarsal or proximal fibula tenderness. Normal range of motion.      Right Achilles Tendon: No tenderness.      Right foot: Normal range of motion and normal capillary refill. Tenderness present. No swelling, foot drop, prominent metatarsal heads, laceration, bony tenderness or crepitus. Normal pulse.        Legs:         Feet:      Neurological: She is alert and oriented to person, place, and time. She has normal strength. No sensory deficit.   Skin: Skin is warm and dry.         ED Course   Procedures  Labs Reviewed   POCT URINE PREGNANCY       Result Value    POC Preg Test, Ur Negative       Acceptable Yes            Imaging Results              X-Ray Ankle Complete Right (Final result)  Result time 07/24/25 01:35:25      Final result by Gavino Villalba MD (07/24/25 01:35:25)                   Impression:      No evidence of acute osseous process involving the right ankle.      Electronically signed by: Gavino Villalba  Date:    07/24/2025  Time:    01:35               Narrative:    EXAMINATION:  XR ANKLE COMPLETE 3 VIEW RIGHT    CLINICAL HISTORY:  Pain in right ankle and joints of right foot    TECHNIQUE:  AP, lateral, and oblique images of the right ankle were performed.    COMPARISON:  None available.    FINDINGS:  No evidence of acute fracture or dislocation involving the right ankle.  No evidence of subcutaneous emphysema or radiopaque foreign body.  No evidence of soft tissue or osseous mass.                                    X-Rays:   Independently Interpreted Readings:   Other Readings:  I personally interpreted and reviewed x-ray I do not see fracture dislocation or widening of ankle mortise    Medications   naproxen tablet 500 mg (500 mg Oral Given 7/24/25 0209)     Medical Decision Making  Emergent eval of a 25-year-old female who  presents to the ER due to right ankle pain for the past 3 days patient reports on Monday after work she was wearing tennis shoes when out with friends.  Was drinking alcohol.  By the end of the night she was unable to bear weight on her right ankle.  She would not remember injuring her ankle.  Since that time she has been wearing a walking boot and Ace wrap that she had from previous injury.  She reports both medial and lateral ankle pain as well as anterior proximal foot pain without swelling.  No knee pain or proximal fibula pain no open wounds  On physical exam patient has full range motion of the ankle but pain to the medial and lateral malleoli as well as in the soft tissue and on ligamentous exam of the medial and lateral ankle that has well as in proximal mid foot.  No swelling or deformity normal pulses  [facility]    Patient presents for emergent evaluation of acute right ankle pain for 3 days with inability to bear weight that poses a threat to life and/or bodily function.   Differential diagnosis includes but was not limited to ankle sprain fracture dislocation contusion.   In the ED patient found to have acute right ankle sprain  I ordered labs and personally reviewed them.  Labs significant for see below  I ordered X-rays and personally reviewed them and reviewed the radiologist interpretation.  Xray significant for see above.      Discharge MDM     Patient was managed in the ED with patient has a walking boot available and in the room with her.  Will give her crutches.  She reports she has been ice pack at home.  Will take Naprosyn 500 mg here discharged home with Naprosyn.  And a work excuse for limited duty follow up with the primary care physician in 2 weeks  The response to treatment was good.    Patient was discharged in stable condition.  Detailed return precautions discussed.  Patient was told to follow up with primary care physician or specialist based on their diagnosis  Sridevi Lau  MD      Amount and/or Complexity of Data Reviewed  Labs: ordered. Decision-making details documented in ED Course.     Details: UPT negative  Radiology: ordered and independent interpretation performed. Decision-making details documented in ED Course.    Risk  Prescription drug management.                                          Clinical Impression:  Final diagnoses:  [M25.571] Right ankle pain  [M25.571] Acute right ankle pain (Primary)  [S93.401A] Sprain of right ankle, unspecified ligament, initial encounter          ED Disposition Condition    Discharge Stable          ED Prescriptions       Medication Sig Dispense Start Date End Date Auth. Provider    naproxen (NAPROSYN) 500 MG tablet Take 1 tablet (500 mg total) by mouth 2 (two) times daily with meals. 60 tablet 7/24/2025 -- Sridevi Lau MD          Follow-up Information       Follow up With Specialties Details Why Contact Info Additional Information    Ethan Alvarenga MD Pediatrics Schedule an appointment as soon as possible for a visit  or [facility] physicians network in 2 weeks if symptoms are not improving [address] [place] [id number]  [phone]       [facility] -  Emergency Medicine Go to  If symptoms worsen [address] [place] [address] [address] [place] [address] 1st floor                   [1]   Social History  Tobacco Use    Smoking status: Never        Sridevi Lau MD  07/24/25 2253